# Patient Record
Sex: FEMALE
[De-identification: names, ages, dates, MRNs, and addresses within clinical notes are randomized per-mention and may not be internally consistent; named-entity substitution may affect disease eponyms.]

---

## 2019-03-12 ENCOUNTER — HOSPITAL ENCOUNTER (EMERGENCY)
Dept: HOSPITAL 25 - UCEAST | Age: 34
Discharge: HOME | End: 2019-03-12
Payer: COMMERCIAL

## 2019-03-12 VITALS — DIASTOLIC BLOOD PRESSURE: 69 MMHG | SYSTOLIC BLOOD PRESSURE: 102 MMHG

## 2019-03-12 DIAGNOSIS — Z88.1: ICD-10-CM

## 2019-03-12 DIAGNOSIS — Z3A.38: ICD-10-CM

## 2019-03-12 DIAGNOSIS — O26.893: Primary | ICD-10-CM

## 2019-03-12 DIAGNOSIS — B97.4: ICD-10-CM

## 2019-03-12 PROCEDURE — 99211 OFF/OP EST MAY X REQ PHY/QHP: CPT

## 2019-03-12 PROCEDURE — G0463 HOSPITAL OUTPT CLINIC VISIT: HCPCS

## 2019-03-12 NOTE — UC
General HPI





- HPI Summary


HPI Summary: 





34 yo female presents with cough for the last 2 days. She tells me that she is 

38 weeks pregnant and is scheduled to be induced later next week. Her 1yo son 

was recently dx'd with RSV and she is concerned she may have this as well. She 

has been taking sudafed with mild relief. Denies fever, chills, sinus symptoms, 

SOB, chest pain, vaginal bleeding. 











- History of Current Complaint


Chief Complaint: UCGeneralIllness


Stated Complaint: TESTING FOR RSV


Time Seen by Provider: 03/12/19 14:59


Hx Obtained From: Patient


Hx Last Menstrual Period: 9 months ago


Onset/Duration: Sudden Onset


Pain Intensity: 0





- Allergy/Home Medications


Allergies/Adverse Reactions: 


 Allergies











Allergy/AdvReac Type Severity Reaction Status Date / Time


 


doxycycline AdvReac Severe Nausea And Verified 03/12/19 14:44





   Vomiting  











Home Medications: 


 Home Medications





Acetaminophen [Tylenol] 2 tab PO ONCE PRN 03/12/19 [History Confirmed 03/12/19]


Prenatal Vit37/Iron/Folic Acid [Prenata] 1 chw PO DAILY 03/12/19 [History 

Confirmed 03/12/19]


Pseudoephedrine HCl [Sudafed] 30 mg PO 03/12/19 [History]











PMH/Surg Hx/FS Hx/Imm Hx





- Additional Past Medical History


Additional PMH: 





None





- Surgical History


Surgical History: None





- Family History


Known Family History: Positive: None





- Social History


Occupation: Employed Full-time


Lives: With Family


Alcohol Use: None


Substance Use Type: None


Smoking Status (MU): Never Smoked Tobacco





Review of Systems


All Other Systems Reviewed And Are Negative: Yes


Constitutional: Positive: Negative


Skin: Positive: Negative


Eyes: Positive: Negative


ENT: Positive: Negative


Respiratory: Positive: Cough


Cardiovascular: Positive: Negative


Gastrointestinal: Positive: Negative


Genitourinary: Positive: Negative


Neurological: Positive: Negative


Psychological: Positive: Negative





Physical Exam





- Summary


Physical Exam Summary: 





GENERAL: NAD. WDWN. No pain distress.


SKIN: No rashes, sores, lesions, or open wounds.


HEENT:


            Head: AT/NC


            Eyes:  EOM intact. Conjunctiva clear without inflammation or 

discharge.


            Ears: Hearing grossly normal. TMs intact, no bulging, erythema, or 

edema. 


            Nose: Nasal mucosa pink and moist. NTTP maxillary and frontal 

sinus. 


            Throat: Posterior oropharynx without exudates, erythema, or 

tonsillar enlargement.  Uvula midline.


NECK: Supple. Nontender. No lymphadenopathy. 


CHEST:  CTAB. No r/r/w. No accessory muscle use. Breathing comfortably and in 

no distress.


CV:  RRR. Without m/r/g. Pulses intact. Cap refill <2seconds


NEURO: Alert. 


PSYCH: Age appropriate behavior.


Triage Information Reviewed: Yes


Vital Signs: 


 Initial Vital Signs











Temp  98.3 F   03/12/19 14:40


 


Pulse  72   03/12/19 14:40


 


Resp  14   03/12/19 14:40


 


BP  102/69   03/12/19 14:40


 


Pulse Ox  100   03/12/19 14:40








 Laboratory Tests











  03/12/19





  15:12


 


RSV Rapid  Positive H











Vital Signs Reviewed: Yes





Course/Dx





- Course


Course Of Treatment: RSV positive. I spoke to Dr. Oakley of [facility] and discussed 

case with him. Treatment remains to be supportive care and does not change pt's 

plan for induction later next week unless symptoms change. Discussed this with 

pt and advised her to f/u with OBGYN as scheduled.





- Diagnoses


Provider Diagnosis: 


 RSV (acute bronchiolitis due to respiratory syncytial virus)








Discharge





- Sign-Out/Discharge


Documenting (check all that apply): Patient Departure


All imaging exams completed and their final reports reviewed: No Studies





- Discharge Plan


Condition: Stable


Disposition: HOME


Patient Education Materials:  Acute Cough (ED)


Referrals: 


No Primary Care Phys,NOPCP [Primary Care Provider] - 


Additional Instructions: 


If you develop a fever, shortness of breath, chest pain, new or worsening 

symptoms - please call your PCP or go to the ED.


 








- Billing Disposition and Condition


Condition: STABLE


Disposition: Home

## 2019-03-14 ENCOUNTER — HOSPITAL ENCOUNTER (EMERGENCY)
Dept: HOSPITAL 25 - UCEAST | Age: 34
Discharge: HOME | End: 2019-03-14
Payer: COMMERCIAL

## 2019-03-14 VITALS — DIASTOLIC BLOOD PRESSURE: 69 MMHG | SYSTOLIC BLOOD PRESSURE: 102 MMHG

## 2019-03-14 DIAGNOSIS — Z88.1: ICD-10-CM

## 2019-03-14 DIAGNOSIS — Z3A.38: ICD-10-CM

## 2019-03-14 DIAGNOSIS — H66.92: ICD-10-CM

## 2019-03-14 DIAGNOSIS — R05: ICD-10-CM

## 2019-03-14 DIAGNOSIS — O99.89: Primary | ICD-10-CM

## 2019-03-14 PROCEDURE — 99212 OFFICE O/P EST SF 10 MIN: CPT

## 2019-03-14 PROCEDURE — G0463 HOSPITAL OUTPT CLINIC VISIT: HCPCS

## 2019-03-14 NOTE — UC
Ear Complaint HPI





- HPI Summary


HPI Summary: 


Patient Chief Complaint:  Left ear pain getting worse over the past 2 days.  

Patient is 38 weeks pregnant and tested positive for RSV.  Her son also has a 

ear infection.  The pain is mild to moderate.








MD note: vital signs stable.








Nurses Note Reviewed. "left ear pain x 1-2 days. tested positive for RSV 2 

days ago"





Visit History Reviewed.  Noncontributory to present complaint.


Medications & Allergies Reviewed.  Allergic to doxycycline.











- History of Current Complaint


Chief Complaint: UCGeneralIllness


Stated Complaint: EAR PAIN


Time Seen by Provider: 19 07:18


Hx Last Menstrual Period: 9 months ago


Pain Intensity: 3





- Allergies/Home Medications


Allergies/Adverse Reactions: 


 Allergies











Allergy/AdvReac Type Severity Reaction Status Date / Time


 


doxycycline AdvReac Severe Nausea And Verified 19 07:12





   Vomiting  











Home Medications: 


 Home Medications





Ranitidine TAB (NF) [Zantac TAB (NF)] 300 mg PO DAILY 19 [History 

Confirmed 19]











PMH/Surg Hx/FS Hx/Imm Hx





- Additional Past Medical History


Additional PMH: 


Patient's past medical history is unremarkable.  She has no admissions for 

serious illness or surgery.





Family history: Patient denies significant chronic illnesses in her family.





Social history: This is the patient's second pregnancy.  She is a nonsmoker.








- Surgical History


Surgical History: None





- Family History


Known Family History: Positive: None





- Social History


Alcohol Use: None


Substance Use Type: None


Smoking Status (MU): Never Smoked Tobacco





Review of Systems


All Other Systems Reviewed And Are Negative: Yes


ENT: Positive: Ear Ache - Left ear


Respiratory: Positive: Cough


Gastrointestinal: Positive: Negative


Genitourinary: Positive: Negative





Physical Exam





- Summary


Physical Exam Summary: 


Appearance: The patient is well-appearing, is in no pain or distress, and is 

well-nourished.


Eyes: Conjunctiva are clear.  Pupils are equal and reactive to light and 

accommodation.  Extra ocular muscle movement is intact.


ENT: The hearing is grossly normal, the pharynx is normal, and the TM on the 

right is normal.  The left time is injected inferiorly. There is no muffled or 

hoarse voice.  No stridor.


Neck: The neck is supple and there is no lymphadenopathy.


Respiratory: The chest is nontender to palpation and without crepitus. The 

lungs are clear, there are normal breath sounds, and there is no respiratory 

distress.  No wheezes, rales or rhonchi.


Cardiovascular: Heart sounds reveal a regular rate and rhythm. There are no 

clicks, rubs or murmurs.  There are no carotid bruits or thrills.  Circulation 

is grossly intact.


Abdomen: The abdomen is soft and nontender. There is no organomegaly.  Bowel 

sounds are present and within normal limits.  No point tenderness at McBurneys 

point.


Musculoskeletal: Strength is intact. The patient moves all extremities.  


Neurological: The patient is alert. Motor and sensory are  examination grossly 

intact.  Speech is normal.


Psychological: The patient displays age appropriate behavior


Skin: Negative for rashes. 








Vital Signs: 


 Initial Vital Signs











Temp  97.6 F   19 07:08


 


Pulse  73   19 07:08


 


Resp  14   19 07:08


 


BP  102/69   19 07:08


 


Pulse Ox  98   19 07:08














Ear Complaint Course/Dx





- Course


Course Of Treatment: This is a 33-year-old healthy female,  2, who is 38 

weeks pregnant and tested positive for RSV, who now complains of 2 days of 

bothersome left ear pain.  Examination shows a mildly injected.  Left tympanic 

membrane in the inferior aspect.  The remainder of her exam is unremarkable.  

She has no abdominal pain or signs of dehydration.  She has some mild 

intermittent cough.  Her lungs are clear.  She is not short of breath.  My 

diagnosis is left otitis media and I will start her on 5 days of amoxicillin.  

She is scheduled to be induced in 6 days and she knows to follow up for any 

increasing pain or temperature.





- Differential Dx/Diagnosis


Differential Diagnosis/HQI/PQRI: Mastoiditis, Otitis Externa, Otitis Media


Provider Diagnosis: 


 Otitis media








Discharge





- Sign-Out/Discharge


Documenting (check all that apply): Patient Departure


All imaging exams completed and their final reports reviewed: No Studies





- Discharge Plan


Condition: Stable


Disposition: HOME


Prescriptions: 


Amoxicillin PO (*) [Amoxicillin 875 MG (*)] 875 mg PO BID #10 tab MDD 2


Patient Education Materials:  Ear Infection (ED), Warm Compress or Soak (ED)


Referrals: 


No Primary Care Phys,NOPCP [Primary Care Provider] - 


Additional Instructions: 


AS WE DISCUSSED:





your diagnosis is left ear infection.





begin amoxicillin, twice a day for five days.





hot tea, steam, warm water to area of sinuses and left ear will help drain fluid

, and make you feel more comfortable.





recheck at any time for increased pain or temperature.





- Billing Disposition and Condition


Condition: STABLE


Disposition: Home